# Patient Record
Sex: FEMALE | Race: BLACK OR AFRICAN AMERICAN | NOT HISPANIC OR LATINO
[De-identification: names, ages, dates, MRNs, and addresses within clinical notes are randomized per-mention and may not be internally consistent; named-entity substitution may affect disease eponyms.]

---

## 2021-01-01 ENCOUNTER — OFFICE VISIT (OUTPATIENT)
Age: 51
End: 2021-01-01

## 2022-06-02 ENCOUNTER — TELEPHONE ENCOUNTER (OUTPATIENT)
Dept: URBAN - METROPOLITAN AREA CLINIC 9 | Facility: CLINIC | Age: 52
End: 2022-06-02

## 2022-06-04 ENCOUNTER — TELEPHONE ENCOUNTER (OUTPATIENT)
Dept: URBAN - METROPOLITAN AREA CLINIC 68 | Facility: CLINIC | Age: 52
End: 2022-06-04

## 2022-06-05 ENCOUNTER — TELEPHONE ENCOUNTER (OUTPATIENT)
Dept: URBAN - METROPOLITAN AREA CLINIC 68 | Facility: CLINIC | Age: 52
End: 2022-06-05

## 2022-06-22 ENCOUNTER — OFFICE VISIT (OUTPATIENT)
Dept: URBAN - METROPOLITAN AREA CLINIC 9 | Facility: CLINIC | Age: 52
End: 2022-06-22

## 2022-06-25 ENCOUNTER — TELEPHONE ENCOUNTER (OUTPATIENT)
Age: 52
End: 2022-06-25

## 2022-06-26 ENCOUNTER — TELEPHONE ENCOUNTER (OUTPATIENT)
Age: 52
End: 2022-06-26

## 2022-07-13 ENCOUNTER — OFFICE VISIT (OUTPATIENT)
Dept: URBAN - METROPOLITAN AREA SURGERY CENTER 9 | Facility: SURGERY CENTER | Age: 52
End: 2022-07-13

## 2022-07-18 ENCOUNTER — TELEPHONE ENCOUNTER (OUTPATIENT)
Dept: URBAN - METROPOLITAN AREA CLINIC 9 | Facility: CLINIC | Age: 52
End: 2022-07-18

## 2022-07-19 ENCOUNTER — TELEPHONE ENCOUNTER (OUTPATIENT)
Dept: URBAN - METROPOLITAN AREA CLINIC 9 | Facility: CLINIC | Age: 52
End: 2022-07-19

## 2022-07-22 ENCOUNTER — LAB OUTSIDE AN ENCOUNTER (OUTPATIENT)
Age: 52
End: 2022-07-22

## 2022-07-26 ENCOUNTER — TELEPHONE ENCOUNTER (OUTPATIENT)
Dept: URBAN - METROPOLITAN AREA CLINIC 9 | Facility: CLINIC | Age: 52
End: 2022-07-26

## 2022-07-27 ENCOUNTER — OFFICE VISIT (OUTPATIENT)
Dept: URBAN - METROPOLITAN AREA CLINIC 9 | Facility: CLINIC | Age: 52
End: 2022-07-27

## 2022-07-27 ENCOUNTER — TELEPHONE ENCOUNTER (OUTPATIENT)
Dept: URBAN - METROPOLITAN AREA CLINIC 9 | Facility: CLINIC | Age: 52
End: 2022-07-27

## 2022-07-27 LAB
HEPATITIS BE ANTIBODY: NONREACTIVE
HEPATITIS BE ANTIGEN: NONREACTIVE
MITOGEN-NIL: (no result)
NIL: (no result)
QUANTIFERON(R)-TB GOLD PLUS, 1 TUBE: POSITIVE
TB1-NIL: (no result)
TB2-NIL: (no result)

## 2022-07-28 ENCOUNTER — TELEPHONE ENCOUNTER (OUTPATIENT)
Dept: URBAN - METROPOLITAN AREA CLINIC 9 | Facility: CLINIC | Age: 52
End: 2022-07-28

## 2022-07-30 ENCOUNTER — TELEPHONE ENCOUNTER (OUTPATIENT)
Age: 52
End: 2022-07-30

## 2022-07-30 RX ORDER — SODIUM SULFATE, POTASSIUM SULFATE, MAGNESIUM SULFATE 17.5; 3.13; 1.6 G/ML; G/ML; G/ML
1 (ONE) SOLUTION, CONCENTRATE ORAL
Qty: 0 | Refills: 2 | OUTPATIENT
Start: 2022-06-22 | End: 2022-06-23

## 2022-07-31 ENCOUNTER — TELEPHONE ENCOUNTER (OUTPATIENT)
Age: 52
End: 2022-07-31

## 2022-07-31 RX ORDER — SODIUM SULFATE, POTASSIUM SULFATE, MAGNESIUM SULFATE 17.5; 3.13; 1.6 G/ML; G/ML; G/ML
1 (ONE) SOLUTION, CONCENTRATE ORAL
Qty: 0 | Refills: 2 | Status: ACTIVE | COMMUNITY
Start: 2022-06-22

## 2022-08-19 ENCOUNTER — LAB OUTSIDE AN ENCOUNTER (OUTPATIENT)
Dept: URBAN - METROPOLITAN AREA CLINIC 9 | Facility: CLINIC | Age: 52
End: 2022-08-19

## 2022-08-20 LAB
HEP A AB, IGM: (no result)
HEP B CORE AB, IGM: (no result)
HEPATITIS A AB, TOTAL: REACTIVE
HEPATITIS B CORE AB TOTAL: (no result)
HEPATITIS B SURFACE ANTIBODY QL: REACTIVE
HEPATITIS B SURFACE ANTIGEN: (no result)

## 2022-08-22 ENCOUNTER — TELEPHONE ENCOUNTER (OUTPATIENT)
Dept: URBAN - METROPOLITAN AREA CLINIC 7 | Facility: CLINIC | Age: 52
End: 2022-08-22

## 2022-08-25 ENCOUNTER — LAB OUTSIDE AN ENCOUNTER (OUTPATIENT)
Dept: URBAN - METROPOLITAN AREA CLINIC 9 | Facility: CLINIC | Age: 52
End: 2022-08-25

## 2022-09-02 ENCOUNTER — LAB OUTSIDE AN ENCOUNTER (OUTPATIENT)
Dept: URBAN - METROPOLITAN AREA CLINIC 9 | Facility: CLINIC | Age: 52
End: 2022-09-02

## 2022-09-07 ENCOUNTER — OFFICE VISIT (OUTPATIENT)
Dept: URBAN - METROPOLITAN AREA CLINIC 9 | Facility: CLINIC | Age: 52
End: 2022-09-07

## 2022-09-07 NOTE — HPI-TODAY'S VISIT:
52 year old female here for f/u. Lat saw her about 6 weeks ago. At prior visit: The patient is a 52 year old female who presents with a subcutaneous abscess. 52-year-old female patient here for follow-up.  We last saw her about a month ago. At prior visit:  52-year-old female comes in to establish care. She saw Dr. Mcclellan April for when she had a rectal abscess and a complex fistula and this was i and d'd and is seton was placed. Biopsies were benign except for acute chronic inflammation. She is here today to evaluate for possible Crohn's. We have labs from April which showed normal hemoglobin of 11.3 and she had a normal CMP in 2021 We have an MRI rectum in 325. 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 Looks in 2021 she had a CT scan which showed 3 perirenal abscess   Patient states her symptoms started in 2015. She started having loose stools diarrhea cramping. They did an EGD and colonoscopy. She says that they told her something was wrong with her colon. At that same time she also joint problems and was diagnosed with RA. She was initially started on a certain medication for her diarrhea but she could not afford it and at that time the GI doctor said since she was started on Imuran by rheumatology for her joint pain this would cover what was going on in her colon. Since that time she has regular bowel movements. She has tried coming off the Imuran though due to possible long-term side effects and on that she will start having loose stool cramping after about 2 months. She did recently try to stop it got pretty significant joint pain so was just started on 10 prednisone yesterday and she restarted the Imuran. Currently asymptomatic from a GI perspective other than the seton in place and sometimes she will have blood coming from this.  At last visit we proceeded with colonoscopy and throughout the colon there was patchy erythema from the rectum to the cecum.  The ileum did appear normal.  The worst was the proctitis.  She had a positive QuantiFERON.  Hep B testing was ordered but it seems like the wrong testing was done.  Biopsy showed chronic colitis throughout.  CT enterography was also ordered which was not done  She is here for follow-up today.  No change in her symptoms.  Intermittent loose stools and cramping.  She has not got in the CT enterography yet.  She was positive for QuantiFERON.  She did have a BCG as a child At last visit, we were going to start remicade but then she had a + qaunterferon and so she was sent to ID to discuss. The plan was to do combo therpay and then eventually stop imuran. We did also order CTE and fecal calpro so we have baseline before starting remicade once cleared by ID. She is immune to hep a and b

## 2022-09-09 LAB — CALPROTECTIN, FECAL: 654

## 2022-09-23 ENCOUNTER — TELEPHONE ENCOUNTER (OUTPATIENT)
Dept: URBAN - METROPOLITAN AREA CLINIC 7 | Facility: CLINIC | Age: 52
End: 2022-09-23

## 2023-04-12 ENCOUNTER — OFFICE VISIT (OUTPATIENT)
Dept: URBAN - METROPOLITAN AREA CLINIC 9 | Facility: CLINIC | Age: 53
End: 2023-04-12

## 2023-04-12 NOTE — HPI-TODAY'S VISIT:
52 year old female here for f/u. Lat saw her about 6 weeks ago. At prior visit: The patient is a 52 year old female who presents with a subcutaneous abscess. 52-year-old female patient here for follow-up.  We last saw her about a month ago. At prior visit:  52-year-old female comes in to establish care. She saw Dr. Mcclellan April for when she had a rectal abscess and a complex fistula and this was i and d'd and is seton was placed. Biopsies were benign except for acute chronic inflammation. She is here today to evaluate for possible Crohn's. We have labs from April which showed normal hemoglobin of 11.3 and she had a normal CMP in 2021 We have an MRI rectum in 325. 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 Looks in 2021 she had a CT scan which showed 3 perirenal abscess   Patient states her symptoms started in 2015. She started having loose stools diarrhea cramping. They did an EGD and colonoscopy. She says that they told her something was wrong with her colon. At that same time she also joint problems and was diagnosed with RA. She was initially started on a certain medication for her diarrhea but she could not afford it and at that time the GI doctor said since she was started on Imuran by rheumatology for her joint pain this would cover what was going on in her colon. Since that time she has regular bowel movements. She has tried coming off the Imuran though due to possible long-term side effects and on that she will start having loose stool cramping after about 2 months. She did recently try to stop it got pretty significant joint pain so was just started on 10 prednisone yesterday and she restarted the Imuran. Currently asymptomatic from a GI perspective other than the seton in place and sometimes she will have blood coming from this.  At last visit we proceeded with colonoscopy and throughout the colon there was patchy erythema from the rectum to the cecum.  The ileum did appear normal.  The worst was the proctitis.  She had a positive QuantiFERON.  Hep B testing was ordered but it seems like the wrong testing was done.  Biopsy showed chronic colitis throughout.  CT enterography was also ordered which was not done  She is here for follow-up today.  No change in her symptoms.  Intermittent loose stools and cramping.  She has not got in the CT enterography yet.  She was positive for QuantiFERON.  She did have a BCG as a child At last visit, we were going to start remicade but then she had a + qaunterferon and so she was sent to ID to discuss. The plan was to do combo therpay and then eventually stop imuran. We did also order CTE and fecal calpro so we have baseline before starting remicade once cleared by ID. She is immune to hep a and b.  Last fecal calprotectin was 654 in September 2022.  CT enterography done in August 2022 was normal

## 2023-05-10 ENCOUNTER — WEB ENCOUNTER (OUTPATIENT)
Dept: URBAN - METROPOLITAN AREA CLINIC 9 | Facility: CLINIC | Age: 53
End: 2023-05-10

## 2023-05-10 ENCOUNTER — OFFICE VISIT (OUTPATIENT)
Dept: URBAN - METROPOLITAN AREA CLINIC 9 | Facility: CLINIC | Age: 53
End: 2023-05-10
Payer: COMMERCIAL

## 2023-05-10 VITALS
BODY MASS INDEX: 28.64 KG/M2 | WEIGHT: 189 LBS | HEIGHT: 68 IN | SYSTOLIC BLOOD PRESSURE: 110 MMHG | DIASTOLIC BLOOD PRESSURE: 80 MMHG

## 2023-05-10 DIAGNOSIS — K50.90 CROHN'S DISEASE WITHOUT COMPLICATION, UNSPECIFIED GASTROINTESTINAL TRACT LOCATION: ICD-10-CM

## 2023-05-10 DIAGNOSIS — K61.1 PERI-RECTAL ABSCESS: ICD-10-CM

## 2023-05-10 PROCEDURE — 99214 OFFICE O/P EST MOD 30 MIN: CPT | Performed by: INTERNAL MEDICINE

## 2023-05-10 RX ORDER — AZATHIOPRINE 50 MG/1
AS DIRECTED TABLET ORAL
Status: ACTIVE | COMMUNITY
Start: 2023-05-10

## 2023-05-10 RX ORDER — SODIUM SULFATE, POTASSIUM SULFATE, MAGNESIUM SULFATE 17.5; 3.13; 1.6 G/ML; G/ML; G/ML
1 (ONE) SOLUTION, CONCENTRATE ORAL
Qty: 0 | Refills: 2 | Status: DISCONTINUED | COMMUNITY
Start: 2022-06-22

## 2023-05-10 RX ORDER — IBUPROFEN 200 MG/1
1 TABLET WITH FOOD OR MILK AS NEEDED TABLET, FILM COATED ORAL THREE TIMES A DAY
Status: ACTIVE | COMMUNITY
Start: 2023-05-10

## 2023-05-10 NOTE — HPI-TODAY'S VISIT:
52 year old female here for f/u. Lat saw her about 6 weeks ago. At prior visit: The patient is a 52 year old female who presents with a subcutaneous abscess. 52-year-old female patient here for follow-up.  We last saw her about a month ago. At prior visit:  52-year-old female comes in to establish care. She saw Dr. Mcclellan April for when she had a rectal abscess and a complex fistula and this was i and d'd and is seton was placed. Biopsies were benign except for acute chronic inflammation. She is here today to evaluate for possible Crohn's. We have labs from April which showed normal hemoglobin of 11.3 and she had a normal CMP in 2021 We have an MRI rectum in 325. 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 Looks in 2021 she had a CT scan which showed 3 perirenal abscess   Patient states her symptoms started in 2015. She started having loose stools diarrhea cramping. They did an EGD and colonoscopy. She says that they told her something was wrong with her colon. At that same time she also joint problems and was diagnosed with RA. She was initially started on a certain medication for her diarrhea but she could not afford it and at that time the GI doctor said since she was started on Imuran by rheumatology for her joint pain this would cover what was going on in her colon. Since that time she has regular bowel movements. She has tried coming off the Imuran though due to possible long-term side effects and on that she will start having loose stool cramping after about 2 months. She did recently try to stop it got pretty significant joint pain so was just started on 10 prednisone yesterday and she restarted the Imuran. Currently asymptomatic from a GI perspective other than the seton in place and sometimes she will have blood coming from this.  At last visit we proceeded with colonoscopy and throughout the colon there was patchy erythema from the rectum to the cecum.  The ileum did appear normal.  The worst was the proctitis.  She had a positive QuantiFERON.  Hep B testing was ordered but it seems like the wrong testing was done.  Biopsy showed chronic colitis throughout.  CT enterography was also ordered which was not done  She is here for follow-up today.  No change in her symptoms.  Intermittent loose stools and cramping.  She has not got in the CT enterography yet.  She was positive for QuantiFERON.  She did have a BCG as a child At last visit, we were going to start remicade but then she had a + qaunterferon and so she was sent to ID to discuss. The plan was to do combo therpay and then eventually stop imuran. We did also order CTE and fecal calpro so we have baseline before starting remicade once cleared by ID. She is immune to hep a and b.  Last fecal calprotectin was 654 in September 2022.  CT enterography done in August 2022 was normal Last scope was 7/2022 with pan colitis cathleen proctitis and bxs showing colitis throughut.  She was supposed to see ID so we could start Remicaide  She is here for follow-up.  It has been a long time since we have seen her.  At last visit she said she saw infectious disease and they wanted to treat her for 4 weeks for TB.  We did not get the notes.  She then saw a rheumatology who want to start Remicade for her RA.  She said she is no longer testing positive for TB.  I have none of these records.  She says she is really the same as before she still has a seton in place for her fistula she still on Imuran 150.  Not really having any other GI symptoms currently

## 2023-05-11 PROBLEM — 34000006: Status: ACTIVE | Noted: 2023-05-11

## 2023-06-08 ENCOUNTER — TELEPHONE ENCOUNTER (OUTPATIENT)
Dept: URBAN - METROPOLITAN AREA CLINIC 9 | Facility: CLINIC | Age: 53
End: 2023-06-08

## 2023-06-21 ENCOUNTER — OFFICE VISIT (OUTPATIENT)
Dept: URBAN - METROPOLITAN AREA CLINIC 9 | Facility: CLINIC | Age: 53
End: 2023-06-21

## 2023-06-21 RX ORDER — IBUPROFEN 200 MG/1
1 TABLET WITH FOOD OR MILK AS NEEDED TABLET, FILM COATED ORAL THREE TIMES A DAY
Status: ACTIVE | COMMUNITY
Start: 2023-05-10

## 2023-06-21 RX ORDER — AZATHIOPRINE 50 MG/1
AS DIRECTED TABLET ORAL
Status: ACTIVE | COMMUNITY
Start: 2023-05-10

## 2023-06-21 NOTE — HPI-TODAY'S VISIT:
52 year old female here for f/u. Lat saw her about 6 weeks ago. At prior visit: The patient is a 52 year old female who presents with a subcutaneous abscess. 52-year-old female patient here for follow-up.  We last saw her about a month ago. At prior visit:  52-year-old female comes in to establish care. She saw Dr. Mcclellan April for when she had a rectal abscess and a complex fistula and this was i and d'd and is seton was placed. Biopsies were benign except for acute chronic inflammation. She is here today to evaluate for possible Crohn's. We have labs from April which showed normal hemoglobin of 11.3 and she had a normal CMP in 2021 We have an MRI rectum in 325. 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 Looks in 2021 she had a CT scan which showed 3 perirenal abscess   Patient states her symptoms started in 2015. She started having loose stools diarrhea cramping. They did an EGD and colonoscopy. She says that they told her something was wrong with her colon. At that same time she also joint problems and was diagnosed with RA. She was initially started on a certain medication for her diarrhea but she could not afford it and at that time the GI doctor said since she was started on Imuran by rheumatology for her joint pain this would cover what was going on in her colon. Since that time she has regular bowel movements. She has tried coming off the Imuran though due to possible long-term side effects and on that she will start having loose stool cramping after about 2 months. She did recently try to stop it got pretty significant joint pain so was just started on 10 prednisone yesterday and she restarted the Imuran. Currently asymptomatic from a GI perspective other than the seton in place and sometimes she will have blood coming from this.  At last visit we proceeded with colonoscopy and throughout the colon there was patchy erythema from the rectum to the cecum.  The ileum did appear normal.  The worst was the proctitis.  She had a positive QuantiFERON.  Hep B testing was ordered but it seems like the wrong testing was done.  Biopsy showed chronic colitis throughout.  CT enterography was also ordered which was not done  She is here for follow-up today.  No change in her symptoms.  Intermittent loose stools and cramping.  She has not got in the CT enterography yet.  She was positive for QuantiFERON.  She did have a BCG as a child At last visit, we were going to start remicade but then she had a + qaunterferon and so she was sent to ID to discuss. The plan was to do combo therpay and then eventually stop imuran. We did also order CTE and fecal calpro so we have baseline before starting remicade once cleared by ID. She is immune to hep a and b.  Last fecal calprotectin was 654 in September 2022.  CT enterography done in August 2022 was normal Last scope was 7/2022 with pan colitis cathleen proctitis and bxs showing colitis throughut.  She was supposed to see ID so we could start Remicaide  She is here for follow-up.  It has been a long time since we have seen her.  At last visit she said she saw infectious disease and they wanted to treat her for 4 weeks for TB.  We did not get the notes.  She then saw a rheumatology who want to start Remicade for her RA.  She said she is no longer testing positive for TB.  I have none of these records.  She says she is really the same as before she still has a seton in place for her fistula she still on Imuran 150.  Not really having any other GI symptoms currently At last visit we ordered a repeat quant to [facility] to decide if we can start her on the Remicade to see if it was negative or positive.  Also basic labs and referred to her Derm. Labs showed

## 2023-07-12 ENCOUNTER — OFFICE VISIT (OUTPATIENT)
Dept: URBAN - METROPOLITAN AREA CLINIC 9 | Facility: CLINIC | Age: 53
End: 2023-07-12
Payer: COMMERCIAL

## 2023-07-12 ENCOUNTER — DASHBOARD ENCOUNTERS (OUTPATIENT)
Age: 53
End: 2023-07-12

## 2023-07-12 ENCOUNTER — TELEPHONE ENCOUNTER (OUTPATIENT)
Dept: URBAN - METROPOLITAN AREA CLINIC 9 | Facility: CLINIC | Age: 53
End: 2023-07-12

## 2023-07-12 ENCOUNTER — WEB ENCOUNTER (OUTPATIENT)
Dept: URBAN - METROPOLITAN AREA CLINIC 9 | Facility: CLINIC | Age: 53
End: 2023-07-12

## 2023-07-12 VITALS
HEIGHT: 68 IN | SYSTOLIC BLOOD PRESSURE: 118 MMHG | DIASTOLIC BLOOD PRESSURE: 68 MMHG | WEIGHT: 182 LBS | BODY MASS INDEX: 27.58 KG/M2

## 2023-07-12 DIAGNOSIS — K61.1 PERI-RECTAL ABSCESS: ICD-10-CM

## 2023-07-12 DIAGNOSIS — K50.90 CROHN'S DISEASE WITHOUT COMPLICATION, UNSPECIFIED GASTROINTESTINAL TRACT LOCATION: ICD-10-CM

## 2023-07-12 DIAGNOSIS — K59.00 CONSTIPATION, UNSPECIFIED CONSTIPATION TYPE: ICD-10-CM

## 2023-07-12 PROCEDURE — 99214 OFFICE O/P EST MOD 30 MIN: CPT | Performed by: INTERNAL MEDICINE

## 2023-07-12 RX ORDER — AZATHIOPRINE 50 MG/1
AS DIRECTED TABLET ORAL
Status: ACTIVE | COMMUNITY
Start: 2023-05-10

## 2023-07-12 RX ORDER — IBUPROFEN 200 MG/1
1 TABLET WITH FOOD OR MILK AS NEEDED TABLET, FILM COATED ORAL THREE TIMES A DAY
Status: DISCONTINUED | COMMUNITY
Start: 2023-05-10

## 2023-07-12 NOTE — HPI-TODAY'S VISIT:
52 year old female here for f/u. Lat saw her about 6 weeks ago. At prior visit: The patient is a 52 year old female who presents with a subcutaneous abscess. 52-year-old female patient here for follow-up.  We last saw her about a month ago. At prior visit:  52-year-old female comes in to establish care. She saw Dr. Mcclellan April for when she had a rectal abscess and a complex fistula and this was i and d'd and is seton was placed. Biopsies were benign except for acute chronic inflammation. She is here today to evaluate for possible Crohn's. We have labs from April which showed normal hemoglobin of 11.3 and she had a normal CMP in 2021 We have an MRI rectum in 325. 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 Looks in 2021 she had a CT scan which showed 3 perirenal abscess   Patient states her symptoms started in 2015. She started having loose stools diarrhea cramping. They did an EGD and colonoscopy. She says that they told her something was wrong with her colon. At that same time she also joint problems and was diagnosed with RA. She was initially started on a certain medication for her diarrhea but she could not afford it and at that time the GI doctor said since she was started on Imuran by rheumatology for her joint pain this would cover what was going on in her colon. Since that time she has regular bowel movements. She has tried coming off the Imuran though due to possible long-term side effects and on that she will start having loose stool cramping after about 2 months. She did recently try to stop it got pretty significant joint pain so was just started on 10 prednisone yesterday and she restarted the Imuran. Currently asymptomatic from a GI perspective other than the seton in place and sometimes she will have blood coming from this.  At last visit we proceeded with colonoscopy and throughout the colon there was patchy erythema from the rectum to the cecum.  The ileum did appear normal.  The worst was the proctitis.  She had a positive QuantiFERON.  Hep B testing was ordered but it seems like the wrong testing was done.  Biopsy showed chronic colitis throughout.  CT enterography was also ordered which was not done  She is here for follow-up today.  No change in her symptoms.  Intermittent loose stools and cramping.  She has not got in the CT enterography yet.  She was positive for QuantiFERON.  She did have a BCG as a child At last visit, we were going to start remicade but then she had a + qaunterferon and so she was sent to ID to discuss. The plan was to do combo therpay and then eventually stop imuran. We did also order CTE and fecal calpro so we have baseline before starting remicade once cleared by ID. She is immune to hep a and b.  Last fecal calprotectin was 654 in September 2022.  CT enterography done in August 2022 was normal Last scope was 7/2022 with pan colitis cathleen proctitis and bxs showing colitis throughut.  She was supposed to see ID so we could start Remicaide  She is here for follow-up.  It has been a long time since we have seen her.  At last visit she said she saw infectious disease and they wanted to treat her for 4 weeks for TB.  We did not get the notes.  She then saw a rheumatology who want to start Remicade for her RA.  She said she is no longer testing positive for TB.  I have none of these records.  She says she is really the same as before she still has a seton in place for her fistula she still on Imuran 150.  Not really having any other GI symptoms currently At last visit we ordered a repeat quant to [facility] to decide if we can start her on the Remicade to see if it was negative or positive.  Also basic labs and referred to her Derm. Labs showed normal cbc/cmp/fe panel  She is immune to hep a/b and vzv. Her tspot came back neg but her quant came back positive again In June 2023 CT enterography was normal just with a lot of stool.  We do not have a recent fecal calprotectin She comes in for follow-up today.  She still has not seen infectious disease.  Her T spot came back negative but her repeat QuantiFERON came back positive.  She had a lot of constipation went to the ER and there was a lot of stool so they started on MiraLAX but now she is just taking prunes every day and having a bowel movement every day.  She stopped eating some unhealthy food and carbs and she is actually lost some weight.  She is still having intermittent rectal bleeding.

## 2023-07-13 PROBLEM — 14760008: Status: ACTIVE | Noted: 2023-07-13

## 2024-12-05 ENCOUNTER — LAB OUTSIDE AN ENCOUNTER (OUTPATIENT)
Dept: URBAN - METROPOLITAN AREA CLINIC 9 | Facility: CLINIC | Age: 54
End: 2024-12-05

## 2024-12-10 ENCOUNTER — OFFICE VISIT (OUTPATIENT)
Dept: URBAN - METROPOLITAN AREA CLINIC 9 | Facility: CLINIC | Age: 54
End: 2024-12-10
Payer: COMMERCIAL

## 2024-12-10 VITALS
WEIGHT: 193 LBS | DIASTOLIC BLOOD PRESSURE: 80 MMHG | SYSTOLIC BLOOD PRESSURE: 130 MMHG | HEIGHT: 68 IN | BODY MASS INDEX: 29.25 KG/M2

## 2024-12-10 DIAGNOSIS — K50.80 CROHN'S DISEASE OF BOTH SMALL AND LARGE INTESTINE WITHOUT COMPLICATION: ICD-10-CM

## 2024-12-10 PROCEDURE — 99215 OFFICE O/P EST HI 40 MIN: CPT | Performed by: INTERNAL MEDICINE

## 2024-12-10 RX ORDER — AZATHIOPRINE 50 MG/1
AS DIRECTED TABLET ORAL
Status: DISCONTINUED | COMMUNITY
Start: 2023-05-10

## 2024-12-10 RX ORDER — OXYCODONE HYDROCHLORIDE AND ACETAMINOPHEN 7.5; 325 MG/1; MG/1
1 TABLET AS NEEDED TABLET ORAL
Qty: 28 TABLET | Refills: 0 | Status: ACTIVE | COMMUNITY

## 2024-12-10 RX ORDER — PREDNISONE 10 MG/1
AS DIRECTED TABLET ORAL ONCE A DAY
Status: ACTIVE | COMMUNITY

## 2024-12-10 NOTE — HPI-TODAY'S VISIT:
52 year old female here for f/u. Lat saw her about 6 weeks ago. At prior visit: The patient is a 52 year old female who presents with a subcutaneous abscess. 52-year-old female patient here for follow-up.  We last saw her about a month ago. At prior visit:  52-year-old female comes in to establish care. She saw Dr. Mcclellan April for when she had a rectal abscess and a complex fistula and this was i and d'd and is seton was placed. Biopsies were benign except for acute chronic inflammation. She is here today to evaluate for possible Crohn's. We have labs from April which showed normal hemoglobin of 11.3 and she had a normal CMP in 2021 We have an MRI rectum in 325. 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 Looks in 2021 she had a CT scan which showed 3 perirenal abscess   Patient states her symptoms started in 2015. She started having loose stools diarrhea cramping. They did an EGD and colonoscopy. She says that they told her something was wrong with her colon. At that same time she also joint problems and was diagnosed with RA. She was initially started on a certain medication for her diarrhea but she could not afford it and at that time the GI doctor said since she was started on Imuran by rheumatology for her joint pain this would cover what was going on in her colon. Since that time she has regular bowel movements. She has tried coming off the Imuran though due to possible long-term side effects and on that she will start having loose stool cramping after about 2 months. She did recently try to stop it got pretty significant joint pain so was just started on 10 prednisone yesterday and she restarted the Imuran. Currently asymptomatic from a GI perspective other than the seton in place and sometimes she will have blood coming from this.  At last visit we proceeded with colonoscopy and throughout the colon there was patchy erythema from the rectum to the cecum.  The ileum did appear normal.  The worst was the proctitis.  She had a positive QuantiFERON.  Hep B testing was ordered but it seems like the wrong testing was done.  Biopsy showed chronic colitis throughout.  CT enterography was also ordered which was not done  She is here for follow-up today.  No change in her symptoms.  Intermittent loose stools and cramping.  She has not got in the CT enterography yet.  She was positive for QuantiFERON.  She did have a BCG as a child At last visit, we were going to start remicade but then she had a + qaunterferon and so she was sent to ID to discuss. The plan was to do combo therpay and then eventually stop imuran. We did also order CTE and fecal calpro so we have baseline before starting remicade once cleared by ID. She is immune to hep a and b.  Last fecal calprotectin was 654 in September 2022.  CT enterography done in August 2022 was normal Last scope was 7/2022 with pan colitis cathleen proctitis and bxs showing colitis throughut.  She was supposed to see ID so we could start Remicaide  She is here for follow-up.  It has been a long time since we have seen her.  At last visit she said she saw infectious disease and they wanted to treat her for 4 weeks for TB.  We did not get the notes.  She then saw a rheumatology who want to start Remicade for her RA.  She said she is no longer testing positive for TB.  I have none of these records.  She says she is really the same as before she still has a seton in place for her fistula she still on Imuran 150.  Not really having any other GI symptoms currently At last visit we ordered a repeat quant to [facility] to decide if we can start her on the Remicade to see if it was negative or positive.  Also basic labs and referred to her Derm. Labs showed normal cbc/cmp/fe panel  She is immune to hep a/b and vzv. Her tspot came back neg but her quant came back positive again In June 2023 CT enterography was normal just with a lot of stool.  We do not have a recent fecal calprotectin She comes in for follow-up today.  She still has not seen infectious disease.  Her T spot came back negative but her repeat QuantiFERON came back positive.  She had a lot of constipation went to the ER and there was a lot of stool so they started on MiraLAX but now she is just taking prunes every day and having a bowel movement every day.  She stopped eating some unhealthy food and carbs and she is actually lost some weight.  She is still having intermittent rectal bleeding.  Interval hx 12.10.2024 54 year old female presents today Previous Colonoscopy completed 07.13.2022 by Dr. Antoine, recommend repeat in 2 years here today for follow up. she is biologic naive, was on imuran by rhuem for RA but now off.  only on prednisone by her rhuem.  she has seton still in place. has flucutation between diarrhea and contipation.  she has not been treated for latent TB, was told by ID she needs 3 months of medicaiton.  no abd pain no weight changes, joint and skin complaints she associates with her RA.    plan: pt needs biologic to tx both her RA and crohns, recommend remicade.   in order to start safely will need tx for latent TB recommended she see her ID doctor to start tx will do stool and serology testing again today c/w steroids per rheum will refer to Dr. Frederic Lam at [facility] for second opinion given pt hesitancy to start remicade   time spent in direct care and review of chart and complex decision making was >45 min

## 2024-12-30 LAB
ACCA: 42
ALCA: 5
AMCA: 15
ATYPICAL PANCA: NEGATIVE
CALPROTECTIN, FECAL: 6380
COMMENTS: (no result)
GASCA: 7

## 2025-05-01 ENCOUNTER — OFFICE VISIT (OUTPATIENT)
Dept: URBAN - METROPOLITAN AREA CLINIC 9 | Facility: CLINIC | Age: 55
End: 2025-05-01
Payer: COMMERCIAL

## 2025-05-01 DIAGNOSIS — M06.9 ACUTE RHEUMATOID ARTHRITIS: ICD-10-CM

## 2025-05-01 DIAGNOSIS — K61.0 PERIANAL ABSCESS: ICD-10-CM

## 2025-05-01 DIAGNOSIS — Z22.7 LATENT TUBERCULOSIS: ICD-10-CM

## 2025-05-01 DIAGNOSIS — K50.814 CROHN'S DISEASE OF BOTH SMALL AND LARGE INTESTINE WITH ABSCESS: ICD-10-CM

## 2025-05-01 PROBLEM — 69896004: Status: ACTIVE | Noted: 2025-05-01

## 2025-05-01 PROBLEM — 71833008: Status: ACTIVE | Noted: 2025-05-01

## 2025-05-01 PROCEDURE — 99214 OFFICE O/P EST MOD 30 MIN: CPT | Performed by: PHYSICIAN ASSISTANT

## 2025-05-01 RX ORDER — OXYCODONE HYDROCHLORIDE AND ACETAMINOPHEN 7.5; 325 MG/1; MG/1
1 TABLET AS NEEDED TABLET ORAL
Qty: 28 TABLET | Refills: 0 | Status: ACTIVE | COMMUNITY

## 2025-05-01 RX ORDER — PREDNISONE 10 MG/1
AS DIRECTED TABLET ORAL ONCE A DAY
Status: ACTIVE | COMMUNITY

## 2025-05-01 NOTE — HPI-TODAY'S VISIT:
52 year old female here for f/u. Lat saw her about 6 weeks ago. At prior visit: The patient is a 52 year old female who presents with a subcutaneous abscess. 52-year-old female patient here for follow-up.  We last saw her about a month ago. At prior visit:  52-year-old female comes in to establish care. She saw Dr. Mcclellan April for when she had a rectal abscess and a complex fistula and this was i and d'd and is seton was placed. Biopsies were benign except for acute chronic inflammation. She is here today to evaluate for possible Crohn's. We have labs from April which showed normal hemoglobin of 11.3 and she had a normal CMP in 2021 We have an MRI rectum in 325. 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 Looks in 2021 she had a CT scan which showed 3 perirenal abscess   Patient states her symptoms started in 2015. She started having loose stools diarrhea cramping. They did an EGD and colonoscopy. She says that they told her something was wrong with her colon. At that same time she also joint problems and was diagnosed with RA. She was initially started on a certain medication for her diarrhea but she could not afford it and at that time the GI doctor said since she was started on Imuran by rheumatology for her joint pain this would cover what was going on in her colon. Since that time she has regular bowel movements. She has tried coming off the Imuran though due to possible long-term side effects and on that she will start having loose stool cramping after about 2 months. She did recently try to stop it got pretty significant joint pain so was just started on 10 prednisone yesterday and she restarted the Imuran. Currently asymptomatic from a GI perspective other than the seton in place and sometimes she will have blood coming from this.  At last visit we proceeded with colonoscopy and throughout the colon there was patchy erythema from the rectum to the cecum.  The ileum did appear normal.  The worst was the proctitis.  She had a positive QuantiFERON.  Hep B testing was ordered but it seems like the wrong testing was done.  Biopsy showed chronic colitis throughout.  CT enterography was also ordered which was not done  She is here for follow-up today.  No change in her symptoms.  Intermittent loose stools and cramping.  She has not got in the CT enterography yet.  She was positive for QuantiFERON.  She did have a BCG as a child At last visit, we were going to start remicade but then she had a + qaunterferon and so she was sent to ID to discuss. The plan was to do combo therpay and then eventually stop imuran. We did also order CTE and fecal calpro so we have baseline before starting remicade once cleared by ID. She is immune to hep a and b.  Last fecal calprotectin was 654 in September 2022.  CT enterography done in August 2022 was normal Last scope was 7/2022 with pan colitis cathleen proctitis and bxs showing colitis throughut.  She was supposed to see ID so we could start Remicaide  She is here for follow-up.  It has been a long time since we have seen her.  At last visit she said she saw infectious disease and they wanted to treat her for 4 weeks for TB.  We did not get the notes.  She then saw a rheumatology who want to start Remicade for her RA.  She said she is no longer testing positive for TB.  I have none of these records.  She says she is really the same as before she still has a seton in place for her fistula she still on Imuran 150.  Not really having any other GI symptoms currently At last visit we ordered a repeat quant to [facility] to decide if we can start her on the Remicade to see if it was negative or positive.  Also basic labs and referred to her Derm. Labs showed normal cbc/cmp/fe panel  She is immune to hep a/b and vzv. Her tspot came back neg but her quant came back positive again In June 2023 CT enterography was normal just with a lot of stool.  We do not have a recent fecal calprotectin She comes in for follow-up today.  She still has not seen infectious disease.  Her T spot came back negative but her repeat QuantiFERON came back positive.  She had a lot of constipation went to the ER and there was a lot of stool so they started on MiraLAX but now she is just taking prunes every day and having a bowel movement every day.  She stopped eating some unhealthy food and carbs and she is actually lost some weight.  She is still having intermittent rectal bleeding.  Dr. Manning saw her last 12/2024 - fecal kim was > 6000 at that time.  She was referred to Dr. Lam at [facility] as pt hesitant to begin Remicaide.    Fecal kim 12/2024 > 6000 Labs - 3/25/25 - CBC, CMP reveiewed. No sig abn. CT - cont  6/2023 - large amount of stool  Colon 2022 - IH, TI - benign ileal mucosa, mild chronic activie colitis throughout colon. Mild chr active proctitis. Re 2 yr  Interim visit 5/1/25 55 y/o pt with Crohn's (sm/lg bowel and perirectal) and RA last seen by Dr. Manning 12/2024. He referred to [facility] as she did not want to begin Remicaide at time of last visit.  She did not see anyone at [facility].  She has now been started on Humira for her RA.  She is unsure of the dose of her Humira. Will obtain records from Rheum. Now tapering prednisone.  She is on INH for latent TB. Following with infectious disease. In terms of Crohn's, has 6-7 stools a day but alternates with constipation. Has had issues with constipation in past.  She could not pass some hard stool.  Took Ducolax which helped by the next.   Advised adding a scoop of Benefiber. Take Miralax if starts getting backed up to avoid straining.  Has a little BRB on TP, no blood in stool.   Still with a seton in place. Follows with Dr. Mcclellan.  Apparently had recent stool testing per rheum - will obtain results.  She is due for colonoscopy. Will schedule in a couple of months to give her some time on the Humira.   RTC 6 months or prn.

## 2025-06-19 ENCOUNTER — OFFICE VISIT (OUTPATIENT)
Dept: URBAN - METROPOLITAN AREA CLINIC 9 | Facility: CLINIC | Age: 55
End: 2025-06-19

## 2025-07-16 ENCOUNTER — LAB OUTSIDE AN ENCOUNTER (OUTPATIENT)
Dept: URBAN - METROPOLITAN AREA CLINIC 9 | Facility: CLINIC | Age: 55
End: 2025-07-16

## 2025-07-16 ENCOUNTER — OFFICE VISIT (OUTPATIENT)
Dept: URBAN - METROPOLITAN AREA CLINIC 9 | Facility: CLINIC | Age: 55
End: 2025-07-16
Payer: COMMERCIAL

## 2025-07-16 DIAGNOSIS — K61.0 PERIANAL ABSCESS: ICD-10-CM

## 2025-07-16 DIAGNOSIS — K50.80 CROHN'S DISEASE OF BOTH SMALL AND LARGE INTESTINE WITHOUT COMPLICATION: ICD-10-CM

## 2025-07-16 DIAGNOSIS — Z22.7 LATENT TUBERCULOSIS: ICD-10-CM

## 2025-07-16 DIAGNOSIS — M06.9 RHEUMATOID ARTHRITIS, INVOLVING UNSPECIFIED SITE, UNSPECIFIED WHETHER RHEUMATOID FACTOR PRESENT: ICD-10-CM

## 2025-07-16 DIAGNOSIS — K50.814 CROHN'S DISEASE OF BOTH SMALL AND LARGE INTESTINE WITH ABSCESS: ICD-10-CM

## 2025-07-16 PROCEDURE — 99215 OFFICE O/P EST HI 40 MIN: CPT | Performed by: INTERNAL MEDICINE

## 2025-07-16 RX ORDER — PREDNISONE 10 MG/1
AS DIRECTED TABLET ORAL ONCE A DAY
Status: ACTIVE | COMMUNITY

## 2025-07-16 RX ORDER — OXYCODONE HYDROCHLORIDE AND ACETAMINOPHEN 7.5; 325 MG/1; MG/1
1 TABLET AS NEEDED TABLET ORAL
Qty: 28 TABLET | Refills: 0 | Status: DISCONTINUED | COMMUNITY

## 2025-07-16 RX ORDER — METRONIDAZOLE 500 MG/1
1 TABLET TABLET ORAL THREE TIMES A DAY
Qty: 15 TABLET | OUTPATIENT
Start: 2025-07-16 | End: 2025-07-21

## 2025-07-16 RX ORDER — CIPROFLOXACIN 500 MG/1
1 TABLET TABLET, FILM COATED ORAL
Qty: 10 TABLET | Refills: 0 | OUTPATIENT
Start: 2025-07-16 | End: 2025-07-21

## 2025-07-16 NOTE — HPI-TODAY'S VISIT:
52 year old female here for f/u. Lat saw her about 6 weeks ago. At prior visit: The patient is a 52 year old female who presents with a subcutaneous abscess. 52-year-old female patient here for follow-up.  We last saw her about a month ago. At prior visit:  52-year-old female comes in to establish care. She saw Dr. Mcclellan April for when she had a rectal abscess and a complex fistula and this was i and d'd and is seton was placed. Biopsies were benign except for acute chronic inflammation. She is here today to evaluate for possible Crohn's. We have labs from April which showed normal hemoglobin of 11.3 and she had a normal CMP in 2021 We have an MRI rectum in 325. 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 Looks in 2021 she had a CT scan which showed 3 perirenal abscess   Patient states her symptoms started in 2015. She started having loose stools diarrhea cramping. They did an EGD and colonoscopy. She says that they told her something was wrong with her colon. At that same time she also joint problems and was diagnosed with RA. She was initially started on a certain medication for her diarrhea but she could not afford it and at that time the GI doctor said since she was started on Imuran by rheumatology for her joint pain this would cover what was going on in her colon. Since that time she has regular bowel movements. She has tried coming off the Imuran though due to possible long-term side effects and on that she will start having loose stool cramping after about 2 months. She did recently try to stop it got pretty significant joint pain so was just started on 10 prednisone yesterday and she restarted the Imuran. Currently asymptomatic from a GI perspective other than the seton in place and sometimes she will have blood coming from this.  At last visit we proceeded with colonoscopy and throughout the colon there was patchy erythema from the rectum to the cecum.  The ileum did appear normal.  The worst was the proctitis.  She had a positive QuantiFERON.  Hep B testing was ordered but it seems like the wrong testing was done.  Biopsy showed chronic colitis throughout.  CT enterography was also ordered which was not done  She is here for follow-up today.  No change in her symptoms.  Intermittent loose stools and cramping.  She has not got in the CT enterography yet.  She was positive for QuantiFERON.  She did have a BCG as a child At last visit, we were going to start remicade but then she had a + qaunterferon and so she was sent to ID to discuss. The plan was to do combo therpay and then eventually stop imuran. We did also order CTE and fecal calpro so we have baseline before starting remicade once cleared by ID. She is immune to hep a and b.  Last fecal calprotectin was 654 in September 2022.  CT enterography done in August 2022 was normal Last scope was 7/2022 with pan colitis cathleen proctitis and bxs showing colitis throughut.  She was supposed to see ID so we could start Remicaide  She is here for follow-up.  It has been a long time since we have seen her.  At last visit she said she saw infectious disease and they wanted to treat her for 4 weeks for TB.  We did not get the notes.  She then saw a rheumatology who want to start Remicade for her RA.  She said she is no longer testing positive for TB.  I have none of these records.  She says she is really the same as before she still has a seton in place for her fistula she still on Imuran 150.  Not really having any other GI symptoms currently At last visit we ordered a repeat quant to [facility] to decide if we can start her on the Remicade to see if it was negative or positive.  Also basic labs and referred to her Derm. Labs showed normal cbc/cmp/fe panel  She is immune to hep a/b and vzv. Her tspot came back neg but her quant came back positive again In June 2023 CT enterography was normal just with a lot of stool.  We do not have a recent fecal calprotectin She comes in for follow-up today.  She still has not seen infectious disease.  Her T spot came back negative but her repeat QuantiFERON came back positive.  She had a lot of constipation went to the ER and there was a lot of stool so they started on MiraLAX but now she is just taking prunes every day and having a bowel movement every day.  She stopped eating some unhealthy food and carbs and she is actually lost some weight.  She is still having intermittent rectal bleeding.  Dr. Manning saw her last 12/2024 - fecal kim was > 6000 at that time.  She was referred to Dr. Lam at [facility] as pt hesitant to begin Remicaide.    Fecal kim 12/2024 > 6000 Labs - 3/25/25 - CBC, CMP reveiewed. No sig abn. CT - cont  6/2023 - large amount of stool  Colon 2022 - IH, TI - benign ileal mucosa, mild chronic activie colitis throughout colon. Mild chr active proctitis. Re 2 yr  Interim visit 5/1/25 55 y/o pt with Crohn's (sm/lg bowel and perirectal) and RA last seen by Dr. Manning 12/2024. He referred to [facility] as she did not want to begin Remicaide at time of last visit.  She did not see anyone at [facility].  She has now been started on Humira for her RA.  She is unsure of the dose of her Humira. Will obtain records from Rheum. Now tapering prednisone.  She is on INH for latent TB. Following with infectious disease. In terms of Crohn's, has 6-7 stools a day but alternates with constipation. Has had issues with constipation in past.  She could not pass some hard stool.  Took Ducolax which helped by the next.   Advised adding a scoop of Benefiber. Take Miralax if starts getting backed up to avoid straining.  Has a little BRB on TP, no blood in stool.   Still with a seton in place. Follows with Dr. Mcclellan.  Apparently had recent stool testing per rheum - will obtain results.  She is due for colonoscopy. Will schedule in a couple of months to give her some time on the Humira.   RTC 6 months or prn.  IH 7/16/25 she was seen in ER for rectal bleeding, CT showing thickening c/w colitis, she was put on cipro flagyl for 10 days.  Last humira dose was 3 weeks ago but she did not take it.   she is also on prednisone from her rheumatology  she is currently on month 5/6 of INH therapy.   seton in place.  she is about 2 months in to hurmira therapy

## 2025-08-07 ENCOUNTER — CLAIMS CREATED FROM THE CLAIM WINDOW (OUTPATIENT)
Dept: URBAN - METROPOLITAN AREA SURGERY CENTER 9 | Facility: SURGERY CENTER | Age: 55
End: 2025-08-07
Payer: COMMERCIAL

## 2025-08-07 ENCOUNTER — CLAIMS CREATED FROM THE CLAIM WINDOW (OUTPATIENT)
Dept: URBAN - METROPOLITAN AREA CLINIC 4 | Facility: CLINIC | Age: 55
End: 2025-08-07
Payer: COMMERCIAL

## 2025-08-07 ENCOUNTER — TELEPHONE ENCOUNTER (OUTPATIENT)
Dept: URBAN - METROPOLITAN AREA CLINIC 7 | Facility: CLINIC | Age: 55
End: 2025-08-07

## 2025-08-07 DIAGNOSIS — K63.89 OTHER SPECIFIED DISEASES OF INTESTINE: ICD-10-CM

## 2025-08-07 DIAGNOSIS — K50.10 CROHN'S DISEASE: ICD-10-CM

## 2025-08-07 DIAGNOSIS — K50.119 CROHN'S DISEASE OF LARGE INTESTINE WITH UNSPECIFIED COMPLICATIONS: ICD-10-CM

## 2025-08-07 DIAGNOSIS — K50.10 CROHN'S DISEASE OF LARGE INTESTINE WITHOUT COMPLICATIONS: ICD-10-CM

## 2025-08-07 DIAGNOSIS — K50.118 CROHN'S DISEASE OF LARGE INTESTINE WITH OTHER COMPLICATION: ICD-10-CM

## 2025-08-07 PROCEDURE — 00811 ANES LWR INTST NDSC NOS: CPT | Performed by: NURSE ANESTHETIST, CERTIFIED REGISTERED

## 2025-08-07 PROCEDURE — 88305 TISSUE EXAM BY PATHOLOGIST: CPT | Performed by: PATHOLOGY

## 2025-08-07 PROCEDURE — 88312 SPECIAL STAINS GROUP 1: CPT | Performed by: PATHOLOGY

## 2025-08-07 PROCEDURE — 45380 COLONOSCOPY AND BIOPSY: CPT | Performed by: INTERNAL MEDICINE

## 2025-08-07 RX ORDER — PREDNISONE 10 MG/1
AS DIRECTED TABLET ORAL ONCE A DAY
Status: ACTIVE | COMMUNITY